# Patient Record
Sex: MALE | NOT HISPANIC OR LATINO | URBAN - METROPOLITAN AREA
[De-identification: names, ages, dates, MRNs, and addresses within clinical notes are randomized per-mention and may not be internally consistent; named-entity substitution may affect disease eponyms.]

---

## 2019-04-01 ENCOUNTER — OFFICE VISIT (OUTPATIENT)
Dept: URBAN - METROPOLITAN AREA CLINIC 23 | Facility: CLINIC | Age: 84
End: 2019-04-01
Payer: COMMERCIAL

## 2019-04-01 DIAGNOSIS — H31.092 OTHER CHORIORETINAL SCARS, LEFT EYE: ICD-10-CM

## 2019-04-01 PROCEDURE — 92014 COMPRE OPH EXAM EST PT 1/>: CPT | Performed by: OPTOMETRIST

## 2019-04-01 ASSESSMENT — INTRAOCULAR PRESSURE
OD: 15
OS: 19

## 2019-04-01 ASSESSMENT — VISUAL ACUITY: OD: 20/30

## 2019-04-01 ASSESSMENT — KERATOMETRY: OD: 43.25

## 2019-04-01 NOTE — IMPRESSION/PLAN
Impression: Other chorioretinal scars, left eye: H31.092. Plan: Discussed visual outcome is uncertain d/t scarring. Pt will see Dr. Jason Todd for consultation in 1 month. Continue to monitor.

## 2019-04-01 NOTE — IMPRESSION/PLAN
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 Plan: PCIOL dislocated. Advised consultation with Dr. Carlin Trevizo in 1 month.

## 2019-04-01 NOTE — IMPRESSION/PLAN
Impression: Type 2 diabetes mellitus without complications: C06.4. Plan: Diabetes type II: no background retinopathy, no signs of neovascularization noted. Discussed ocular and systemic benefits of blood sugar control.

## 2019-05-03 ENCOUNTER — OFFICE VISIT (OUTPATIENT)
Dept: URBAN - METROPOLITAN AREA CLINIC 23 | Facility: CLINIC | Age: 84
End: 2019-05-03
Payer: COMMERCIAL

## 2019-05-03 DIAGNOSIS — E11.3293 TYPE 2 DIAB W MILD NONPRLF DIABETIC RTNOP W/O MACULAR EDEMA, BILATERAL: ICD-10-CM

## 2019-05-03 DIAGNOSIS — H34.8322 TRIBUTARY (BRANCH) RETINAL VEIN OCCLUSION, LEFT EYE, STABLE: ICD-10-CM

## 2019-05-03 DIAGNOSIS — H35.3223 EXUDATIVE AGE-RELATED MACULAR DEGENERATION, LEFT EYE, WITH INACTIVE SCAR: ICD-10-CM

## 2019-05-03 PROCEDURE — 92014 COMPRE OPH EXAM EST PT 1/>: CPT | Performed by: OPHTHALMOLOGY

## 2019-05-03 PROCEDURE — 92134 CPTRZ OPH DX IMG PST SGM RTA: CPT | Performed by: OPHTHALMOLOGY

## 2019-05-03 ASSESSMENT — INTRAOCULAR PRESSURE
OS: 17
OD: 16

## 2019-05-03 NOTE — IMPRESSION/PLAN
Impression: Type 2 diab w mild nonprlf diabetic rtnop w/o macular edema, bilateral: G21.4182. OU. Condition: established, stable. Plan: Discussed diagnosis in detail with patient. No treatment is required at this time. Will continue to observe condition and or symptoms. Discussed risks of progression. Emphasized blood sugar control. Call if [address] worsens. 
Pt to continue eye care with Dr Lion Carrillo

## 2019-05-03 NOTE — IMPRESSION/PLAN
Impression: Exudative age-related macular degeneration, left eye, with inactive scar: G26.9966. OS. Condition: established, stable. Vision: vision affected. Plan: Discussed diagnosis in detail with patient. No treatment is required at this time, explained to patient poor prognosis considering hx of symptoms along with other diagnosis of diabetes and  previous BRVO. OCT shows: thinning retina- poor prognosis OS Pt to continue eyecare with Dr Dionicio Moulton

## 2019-05-03 NOTE — IMPRESSION/PLAN
Impression: Nonexudative age-related macular degeneration, right eye, intermediate dry stage: H35.3112. OD. Condition: established, stable. Vision: vision not affected. Plan: Discussed diagnosis in detail with patient. No treatment is required at this time. Recommend AREDS 2 formula. Call the office for an immediate appointment if [address] worsens.  OCT shows Stable, no active SRF/IRF OD

## 2019-05-03 NOTE — IMPRESSION/PLAN
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 OS. Condition: stable. Vision: vision affected. Plan: Exam shows IOL shifted temporally, Gonio exam done shows IOL far inferior vitreous. Advised patient of condition. Discussed diagnosis in detail with patient. No treatment is required at this time. Will continue to observe condition and or symptoms. Call if [address] worsens.

## 2019-05-03 NOTE — IMPRESSION/PLAN
Impression: Tributary (branch) retinal vein occlusion, left eye, stable: H26.9482. OS. Condition: established, stable. Plan: Discussed diagnosis in detail with patient. No treatment is required at this time. Will continue to observe condition and or symptoms. Discussed risks of progression. Call if [facility] [place] [facility] worsens.

## 2019-08-09 ENCOUNTER — OFFICE VISIT (OUTPATIENT)
Dept: URBAN - METROPOLITAN AREA CLINIC 23 | Facility: CLINIC | Age: 84
End: 2019-08-09
Payer: COMMERCIAL

## 2019-08-09 DIAGNOSIS — H27.112 SUBLUXATION OF LENS, LEFT EYE: ICD-10-CM

## 2019-08-09 DIAGNOSIS — H35.3112 NONEXUDATIVE AGE-RELATED MACULAR DEGENERATION, RIGHT EYE, INTERMEDIATE DRY STAGE: ICD-10-CM

## 2019-08-09 PROCEDURE — 99213 OFFICE O/P EST LOW 20 MIN: CPT | Performed by: OPHTHALMOLOGY

## 2019-08-09 PROCEDURE — 92134 CPTRZ OPH DX IMG PST SGM RTA: CPT | Performed by: OPHTHALMOLOGY

## 2019-08-09 ASSESSMENT — INTRAOCULAR PRESSURE
OD: 15
OS: 17

## 2019-08-09 NOTE — IMPRESSION/PLAN
Impression: Tributary (branch) retinal vein occlusion, left eye, stable: V98.0445. OS. Condition: established, stable. Plan: Discussed diagnosis in detail with patient. No treatment is required at this time. Will continue to observe condition and or symptoms. Discussed risks of progression. Call if [address] worsens. Recommend to continue eye care w/Optometrist in 4 mos.

## 2019-08-09 NOTE — IMPRESSION/PLAN
Impression: Nonexudative age-related macular degeneration, right eye, intermediate dry stage: H35.3112. OD. Condition: established, stable. Vision: vision not affected. Plan: Discussed diagnosis in detail with patient. No treatment is required at this time. Recommend AREDS 2 formula. Call the office for an immediate appointment if [address] worsens. OCT shows Stable, no active SRF/IRF OD. Recommend to continue eye care w/Optometrist in 4 mos.

## 2019-08-09 NOTE — IMPRESSION/PLAN
Impression: Exudative age-related macular degeneration, left eye, with inactive scar: J58.9838. OS. Condition: established, stable. Vision: vision affected. Plan: Discussed diagnosis in detail with patient. No treatment is required at this time, explained to patient poor prognosis considering hx of symptoms along with other diagnosis of diabetes and  previous BRVO. OCT shows: thinning retina- poor prognosis OS. Recommend to continue eye care with Optometrist in 4 mos. 
Pt to continue eye care with Dr Dionicio Moulton

## 2019-08-09 NOTE — IMPRESSION/PLAN
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 OS. Condition: stable. Vision: vision affected. Plan: Discussed diagnosis with patient. Exam shows IOL OS shifted temporally far inferior vitreous. Advised patient of condition. Discussed diagnosis in detail with patient. No treatment is required at this time. Will continue to observe condition and or symptoms. Call if [address] worsens.

## 2019-12-16 ENCOUNTER — OFFICE VISIT (OUTPATIENT)
Dept: URBAN - METROPOLITAN AREA CLINIC 23 | Facility: CLINIC | Age: 84
End: 2019-12-16
Payer: COMMERCIAL

## 2019-12-16 PROCEDURE — 92134 CPTRZ OPH DX IMG PST SGM RTA: CPT | Performed by: OPTOMETRIST

## 2019-12-16 PROCEDURE — 92014 COMPRE OPH EXAM EST PT 1/>: CPT | Performed by: OPTOMETRIST

## 2019-12-16 ASSESSMENT — INTRAOCULAR PRESSURE
OS: 14
OD: 12

## 2019-12-16 NOTE — IMPRESSION/PLAN
Impression: Type 2 diab w mild nonprlf diabetic rtnop w/o macular edema, bilateral: N56.2128 OU. Plan: Discussed diagnosis in detail with patient. Advised and emphasized patient of blood sugar control. Discussed risks of progression. Poor compliance can lead to blindness. OCT macula performed and reviewed with patient today- Mild signs of diabetic retinopathy. Reassured patient of condition and treatment. Will continue to observe condition and or symptoms.

## 2019-12-16 NOTE — IMPRESSION/PLAN
Impression: Tributary (branch) retinal vein occlusion, left eye, stable: O04.1196. Plan: Discussed diagnosis in detail with patient. No treatment is required at this time. Reassured patient of current condition and treatment. Will continue to observe condition and or symptoms.

## 2020-12-14 ENCOUNTER — OFFICE VISIT (OUTPATIENT)
Dept: URBAN - METROPOLITAN AREA CLINIC 23 | Facility: CLINIC | Age: 85
End: 2020-12-14
Payer: COMMERCIAL

## 2020-12-14 DIAGNOSIS — H31.012 MACULA SCAR OF POST POLE OF LEFT EYE: ICD-10-CM

## 2020-12-14 DIAGNOSIS — E11.9 TYPE 2 DIABETES MELLITUS WITHOUT COMPLICATIONS: Primary | ICD-10-CM

## 2020-12-14 PROCEDURE — 92014 COMPRE OPH EXAM EST PT 1/>: CPT | Performed by: OPTOMETRIST

## 2020-12-14 ASSESSMENT — INTRAOCULAR PRESSURE
OD: 16
OS: 17

## 2020-12-14 NOTE — IMPRESSION/PLAN
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 Plan: Discussed diagnosis in detail with patient. Discussed treatment options with patient. Reassured patient of current condition and treatment. Will continue to observe condition and or symptoms. No change to current treatment.

## 2020-12-14 NOTE — IMPRESSION/PLAN
Impression: Type 2 diabetes mellitus without complications: L70.0 Bilateral. Plan: Discussed diagnosis in detail with patient. Advised and emphasized to the patient about blood sugar control. Discussed risks of progression and poor compliance can lead to blindness. Will continue to observe condition and/or symptoms.

## 2021-12-20 ENCOUNTER — OFFICE VISIT (OUTPATIENT)
Dept: URBAN - METROPOLITAN AREA CLINIC 23 | Facility: CLINIC | Age: 86
End: 2021-12-20
Payer: COMMERCIAL

## 2021-12-20 DIAGNOSIS — H35.3111 NEXDTVE AGE-RELATED MCLR DEGN, RIGHT EYE, EARLY DRY STAGE: ICD-10-CM

## 2021-12-20 PROCEDURE — 99214 OFFICE O/P EST MOD 30 MIN: CPT | Performed by: OPTOMETRIST

## 2021-12-20 ASSESSMENT — INTRAOCULAR PRESSURE
OS: 18
OD: 16

## 2021-12-20 NOTE — IMPRESSION/PLAN
Impression: Nexdtve age-related mclr degn, right eye, early dry stage: H35.3111. Plan: Macular degeneration, dry type with stable vision. Will continue to monitor vision and the patient has been instructed to call with any vision changes. Use of vitamins has shown to improve the effects of ARMD, recommend that pt take AREDS 2 daily.

## 2021-12-20 NOTE — IMPRESSION/PLAN
Impression: Other chorioretinal scars, left eye: H31.092. Plan: pt [facility]. monitor yearly.  stable

## 2021-12-20 NOTE — IMPRESSION/PLAN
Impression: Type 2 diabetes mellitus w/o complication: V90.3. Plan: Discussed diagnosis in detail with patient. Advised and emphasized patient of blood sugar control. Discussed risks of progression. Poor compliance can lead to blindness. Optos performed and reviewed with patient today. Reassured patient of condition and treatment. Will continue to observe condition and or symptoms.

## 2022-12-22 ENCOUNTER — OFFICE VISIT (OUTPATIENT)
Dept: URBAN - METROPOLITAN AREA CLINIC 23 | Facility: CLINIC | Age: 87
End: 2022-12-22
Payer: COMMERCIAL

## 2022-12-22 DIAGNOSIS — H35.3111 NONEXUDATIVE AGE-RELATED MACULAR DEGENERATION, RIGHT EYE, EARLY DRY STAGE: ICD-10-CM

## 2022-12-22 DIAGNOSIS — E11.9 TYPE 2 DIABETES MELLITUS W/O COMPLICATION: Primary | ICD-10-CM

## 2022-12-22 DIAGNOSIS — H31.092 OTHER CHORIORETINAL SCARS, LEFT EYE: ICD-10-CM

## 2022-12-22 PROCEDURE — 99213 OFFICE O/P EST LOW 20 MIN: CPT | Performed by: OPTOMETRIST

## 2022-12-22 PROCEDURE — 92134 CPTRZ OPH DX IMG PST SGM RTA: CPT | Performed by: OPTOMETRIST

## 2022-12-22 ASSESSMENT — INTRAOCULAR PRESSURE
OD: 15
OS: 14

## 2022-12-22 ASSESSMENT — KERATOMETRY: OS: 44.75

## 2022-12-22 NOTE — IMPRESSION/PLAN
Impression: Nonexudative age-related macular degeneration, right eye, early dry stage: H35.3111. Plan: Pt edu on all findings. MAC OCT performed and reviewed with patient. Remains stable OU. Continue to monitor. Instructed patient to call with any sudden vision changes. RTC 1 year DFE with MAC OCT.

## 2022-12-22 NOTE — IMPRESSION/PLAN
Impression: Type 2 diabetes mellitus w/o complication: E94.9. Plan: Discussed diagnosis in detail with patient. Advised and emphasized patient of blood sugar control. Discussed risks of progression. Poor compliance can lead to blindness. Optos performed and reviewed with patient today. Reassured patient of condition and treatment. Will continue to observe condition and or symptoms.